# Patient Record
Sex: FEMALE | Race: WHITE
[De-identification: names, ages, dates, MRNs, and addresses within clinical notes are randomized per-mention and may not be internally consistent; named-entity substitution may affect disease eponyms.]

---

## 2019-11-02 ENCOUNTER — HOSPITAL ENCOUNTER (EMERGENCY)
Dept: HOSPITAL 41 - JD.ED | Age: 24
Discharge: HOME | End: 2019-11-02
Payer: COMMERCIAL

## 2019-11-02 DIAGNOSIS — Z91.040: ICD-10-CM

## 2019-11-02 DIAGNOSIS — M53.3: ICD-10-CM
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
## 2019-11-02 DIAGNOSIS — Z88.0: ICD-10-CM

## 2019-11-02 DIAGNOSIS — S06.0X0A: Primary | ICD-10-CM
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
## 2019-11-02 NOTE — EDM.PDOC
ED HPI GENERAL MEDICAL PROBLEM





- General


Chief Complaint: Head Injury


Stated Complaint: BUCKED OFF HORSE HEAD INJURY


Time Seen by Provider: 11/02/19 14:29


Source of Information: Reports: Patient, Family


History Limitations: Reports: No Limitations





- History of Present Illness


INITIAL COMMENTS - FREE TEXT/NARRATIVE: 





The patient presents after being bucked off of her horse with a headache, neck 

pain and tailbone pain.  She was getting off her horse and he bucked.  She 

landed on her buttocks and then head.  She has a headache but no numbness or 

weakness.  She has some neck pain.  She has pain to her coccyx.  She hurt it 

back in April.  She had no LOC.  She walked in to the ER.


Onset: Sudden


Duration: Minutes:


Location: Reports: Head, Neck, Other (Coccyx)


Quality: Reports: Sharp


Severity: Moderate


Improves with: Reports: None


Worsens with: Reports: None


Associated Symptoms: Reports: Headaches.  Denies: Chest Pain, Fever/Chills, 

Nausea/Vomiting, Shortness of Breath


  ** Head


Pain Score (Numeric/FACES): 6





- Related Data


 Allergies











Allergy/AdvReac Type Severity Reaction Status Date / Time


 


latex Allergy  Rash Verified 11/02/19 14:15


 


Penicillins Allergy  Rash Verified 11/02/19 14:15











Home Meds: 


 Home Meds





Ethinyl Estradiol/Drospirenone [Edwige 3 mg-0.02 mg Tablet] 1 each PO DAILY 11/02 /19 [History]











Past Medical History


Cardiovascular History: Reports: None


Respiratory History: Reports: None


Gastrointestinal History: Reports: None


Genitourinary History: Reports: None


OB/GYN History: Reports: None


Neurological History: Reports: None


Psychiatric History: Reports: None


Endocrine/Metabolic History: Reports: None


Hematologic History: Reports: None


Immunologic History: Reports: None


Oncologic (Cancer) History: Reports: None


Dermatologic History: Reports: None





- Infectious Disease History


Infectious Disease History: Reports: None





- Past Surgical History


Head Surgeries/Procedures: Reports: None


HEENT Surgical History: Reports: Oral Surgery


Other Musculoskeletal Surgeries/Procedures:: Left hand surgery.





Social & Family History





- Tobacco Use


Smoking Status *Q: Never Smoker





- Caffeine Use


Caffeine Use: Reports: Coffee





- Recreational Drug Use


Recreational Drug Use: No





ED ROS GENERAL





- Review of Systems


Review Of Systems: See Below


Constitutional: Reports: No Symptoms


HEENT: Reports: No Symptoms


Respiratory: Reports: No Symptoms


Cardiovascular: Reports: No Symptoms


Endocrine: Reports: No Symptoms


GI/Abdominal: Reports: No Symptoms


: Reports: No Symptoms


Musculoskeletal: Reports: Neck Pain, Other (Coccyx pain)





ED EXAM, HEAD INJURY





- Physical Exam


Exam: See Below


Exam Limited By: No Limitations


General Appearance: Alert, No Apparent Distress


Head: Atraumatic, Normocephalic


Ears: Normal External Exam


Nose: Normal Inspection


Neck: Tenderness (Mild midline tenderness)


Respiratory: No Respiratory Distress, Lungs Clear, Normal Breath Sounds


Cardiovascular: Regular Rate, Rhythm, No Edema, No Murmur


GI/Abdominal Exam: Soft, Non-Tender, No Organomegaly, No Mass


Back Exam: Normal Inspection


Extremities: Normal Inspection





Course





- Vital Signs


Last Recorded V/S: 





 Last Vital Signs











Temp  98.5 F   11/02/19 14:12


 


Pulse  90   11/02/19 14:12


 


Resp  16   11/02/19 14:12


 


BP  117/72   11/02/19 14:12


 


Pulse Ox  100   11/02/19 14:12














- Re-Assessments/Exams


Free Text/Narrative Re-Assessment/Exam: 





11/02/19 15:52


I ordered a CT of her head and cervical spine and they looked good.  I will 

discharge her home.





Departure





- Departure


Time of Disposition: 15:55


Disposition: Home, Self-Care 01


Condition: Good


Clinical Impression: 


 Concussion injury of brain, Animal-rider injured by fall from or being thrown 

from horse in noncollision accident, initial encounter, Coccyx pain








- Discharge Information


*PRESCRIPTION DRUG MONITORING PROGRAM REVIEWED*: No


*COPY OF PRESCRIPTION DRUG MONITORING REPORT IN PATIENT JEREMY: No


Referrals: 


Laury Callaway MD [Primary Care Provider] - 1 Week


Additional Instructions: 


Try to take it easy the next couple of days.  Take tylenol or motrin for any 

pain.  Please return if you are worse.

## 2019-11-02 NOTE — CT
Head CT

 

Technique: Multiple axial sections through the brain were obtained.  

Intravenous contrast was not utilized.

 

Comparison: Priors head CT study of 11/07/11.

 

Findings: Ventricles along with basal cisterns and sulci over 

convexities appear within normal limits for the patient's age.  No 

abnormal parenchymal densities are seen.  No evidence of intracranial 

hemorrhage or mass effect.  Bone window settings were reviewed which 

shows no acute calvarial abnormality.  Visualized mastoid sinuses and 

paranasal sinuses show nothing acute.

 

Impression:

1.  Nothing acute is seen on noncontrast head CT exam.

 

Diagnostic code #1

## 2019-11-02 NOTE — CT
CT cervical spine

 

Technique: Multiple axial sections were obtained from above C1 

inferiorly to the bottom of T2.  Reconstructed sagittal and coronal 

images were reviewed.

 

Findings: Slight ligamentum nuchal calcification is seen.  Vertebral 

body heights and disc spaces are maintained.  No fracture is 

appreciated within the cervical spine.  No abnormal subluxation is 

identified.  No bony central or bony neural foraminal stenosis is 

seen.

 

Impression:

1.  Incidental ligamentum nuchal calcification.

2.  Nothing acute is appreciated on CT study of the cervical spine.

 

Diagnostic code #2

## 2021-10-23 ENCOUNTER — HOSPITAL ENCOUNTER (EMERGENCY)
Dept: HOSPITAL 41 - JD.ED | Age: 26
Discharge: HOME | End: 2021-10-23
Payer: COMMERCIAL

## 2021-10-23 DIAGNOSIS — S30.1XXA: Primary | ICD-10-CM

## 2021-10-23 DIAGNOSIS — Z88.0: ICD-10-CM

## 2021-10-23 DIAGNOSIS — Z91.040: ICD-10-CM
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
## 2021-10-23 PROCEDURE — 74177 CT ABD & PELVIS W/CONTRAST: CPT

## 2021-10-23 PROCEDURE — 36415 COLL VENOUS BLD VENIPUNCTURE: CPT

## 2021-10-23 PROCEDURE — 96375 TX/PRO/DX INJ NEW DRUG ADDON: CPT

## 2021-10-23 PROCEDURE — 96374 THER/PROPH/DIAG INJ IV PUSH: CPT

## 2021-10-23 PROCEDURE — 80053 COMPREHEN METABOLIC PANEL: CPT

## 2021-10-23 PROCEDURE — 81001 URINALYSIS AUTO W/SCOPE: CPT

## 2021-10-23 PROCEDURE — 85025 COMPLETE CBC W/AUTO DIFF WBC: CPT

## 2021-10-23 PROCEDURE — 82150 ASSAY OF AMYLASE: CPT

## 2021-10-23 PROCEDURE — 99284 EMERGENCY DEPT VISIT MOD MDM: CPT

## 2021-10-23 NOTE — EDM.PDOC
ED HPI GENERAL MEDICAL PROBLEM





- General


Chief Complaint: Trauma


Stated Complaint: BUCKED OFF HORSE THIS AM


Time Seen by Provider: 10/23/21 09:08


Source of Information: Reports: Patient, Family


History Limitations: Reports: No Limitations





- History of Present Illness


INITIAL COMMENTS - FREE TEXT/NARRATIVE: 


26-year-old female who is in good health presents to the ED after being bucked 

off a horse this morning at approximately 0745 hrs.  She believes the saddle 

horn hit her in the right lower quadrant of the abdomen just above the inguinal 

ligament making it very difficult to walk or flex her hip on the right side.  

She denies hitting her head or hurting her neck.  No back pain no rib pain she 

believes she landed a bit on her left shoulder but has no significant discomfort

in the shoulder at this time.  Pain is primarily right lower quadrant of the 

abdomen.  She rates it as 8 out of 10.  Denies possibility of pregnancy.  Has 

not voided since time of injury.  She is on oral contraceptive birth control.  

She is allergic to latex and penicillin.





Onset: Today, Sudden


Onset Date: 10/23/21


Onset Time: 07:45


Duration: Minutes:, Getting Worse


Location: Reports: Abdomen (Right lower quadrant of the abdomen just above the 

inguinal ligament)


Quality: Reports: Ache


Severity: Moderate


Improves with: Reports: Rest


Worsens with: Reports: Movement (Any attempt to flex or fully weight-bear on the

right leg causes significant pain in the right lower quadrant of the abdomen)


Context: Reports: Trauma (Bucked off a horse this morning.).  Denies: Activity, 

Exercise, Lifting, Sick Contact


Associated Symptoms: Denies: Confusion, Chest Pain, Cough, cough w sputum, 

Diaphoresis, Fever/Chills, Headaches, Loss of Appetite, Malaise, 

Nausea/Vomiting, Rash, Seizure, Shortness of Breath, Syncope, Weakness


Treatments PTA: Reports: Other (see below) (None.)


  ** Right Pelvic


Pain Score (Numeric/FACES): 9





- Related Data


                                    Allergies











Allergy/AdvReac Type Severity Reaction Status Date / Time


 


latex Allergy  Rash Verified 10/23/21 09:01


 


Penicillins Allergy  Rash Verified 10/23/21 09:01











Home Meds: 


                                    Home Meds





Ethinyl Estradiol/Drospirenone [Edwige 3 mg-0.02 mg Tablet] 1 each PO DAILY 11/ 02/19 [History]


oxyCODONE HCl/Acetaminophen [Percocet 5-325 mg Tablet] 1 each PO Q4H PRN #15 

tablet 10/23/21 [Rx]











Past Medical History


Cardiovascular History: Reports: None


Respiratory History: Reports: None


Gastrointestinal History: Reports: None


Genitourinary History: Reports: None


OB/GYN History: Reports: None


Neurological History: Reports: None


Psychiatric History: Reports: None


Endocrine/Metabolic History: Reports: None


Hematologic History: Reports: None


Immunologic History: Reports: None


Oncologic (Cancer) History: Reports: None


Dermatologic History: Reports: None





- Infectious Disease History


Infectious Disease History: Reports: None





- Past Surgical History


Head Surgeries/Procedures: Reports: None


HEENT Surgical History: Reports: Oral Surgery


Other Musculoskeletal Surgeries/Procedures:: Left hand surgery.





Social & Family History





- Caffeine Use


Caffeine Use: Reports: Coffee





- Living Situation & Occupation


Living situation: Reports: Single


Occupation: Employed





Review of Systems





- Review of Systems


Review Of Systems: See Below


Constitutional: Reports: No Symptoms


Eyes: Reports: No Symptoms


Ears: Reports: No Symptoms


Nose: Reports: No Symptoms


Mouth/Throat: Reports: No Symptoms


Respiratory: Reports: No Symptoms


Cardiovascular: Reports: No Symptoms


GI/Abdominal: Reports: No Symptoms


Genitourinary: Reports: No Symptoms


Musculoskeletal: Reports: No Symptoms


Skin: Reports: No Symptoms


Neurological: Reports: No Symptoms


Psychiatric: Reports: No Symptoms





ED EXAM, GENERAL





- Physical Exam


Exam: See Below


Exam Limited By: No Limitations


General Appearance: Alert, WD/WN, Mild Distress, Other (Temperature is 36.2 

degrees heart rate 54 and sinus respiratory is 18 with O2 sats 100% room air.  

BP is 103/61)


Throat/Mouth: Normal Inspection, Normal Lips, Normal Oropharynx, Other


Head: Atraumatic (No injury to the tongue or dentition), Normocephalic


Neck: Normal Inspection, Supple, Non-Tender, Full Range of Motion.  No: Carotid 

Bruit, Lymphadenopathy (L), Lymphadenopathy (R)


Respiratory/Chest: No Respiratory Distress, Lungs Clear, Normal Breath Sounds, 

No Accessory Muscle Use, Other


Cardiovascular: Normal Peripheral Pulses, Regular Rate, Rhythm, No Edema, No 

Gallop, No Murmur (No pain on firm compression of chest wall and sternum), No 

Rub


Peripheral Pulses: 3+: Carotid (L), Carotid (R), Posterior Tibial (L), Posterior

 Tibial (R), Dorsalis Pedis (L), Dorsalis Pedis (R)


GI/Abdominal: Normal Bowel Sounds, Soft, No Organomegaly, No Distention, 

Guarding, Tender (Tenderness right lower quadrant just above the inguinal 

ligament and above the pubic symphysis and slightly lateral to this area.  There

 is mild erythema in this area.  There is guarding in this area)


Back Exam: Normal Inspection, Full Range of Motion.  No: CVA Tenderness (L), CVA

 Tenderness (R)


Extremities: Normal Inspection, Non-Tender, No Pedal Edema, Other (Full range of

 motion of both upper extremities no pain over the clavicles or 

acromioclavicular joints.  She has pain with any attempt to fully trying to 

fully flex or stand with full weight on her right lower extremity in her lower 

abdomen and pelvis area.)


Neurological: Alert, Oriented, CN II-XII Intact, Normal Cognition


Psychiatric: Normal Affect, Anxious


Skin Exam: Warm, Dry, Intact, Normal Color, No Rash





Course





- Vital Signs


Last Recorded V/S: 


                                Last Vital Signs











Temp  36.2 C   10/23/21 08:58


 


Pulse  60   10/23/21 11:14


 


Resp  16   10/23/21 11:14


 


BP  101/60   10/23/21 11:14


 


Pulse Ox  100   10/23/21 11:14














- Orders/Labs/Meds


Orders: 


                               Active Orders 24 hr











 Category Date Time Status


 


 Dextrose 5%-0.9% NaCl [Dextrose 5%-Normal Saline] 1,000 Med  10/23/21 09:15 

Active





 ml   





 IV ASDIRECTED   








                                Medication Orders





Dextrose/Sodium Chloride (Dextrose 5%-Normal Saline)  1,000 mls @ 150 mls/hr IV 

ASDIRECTED ALAINA


   Last Admin: 10/23/21 09:23  Dose: 150 mls/hr


   Documented by: PRISCILLA








Labs: 


                                Laboratory Tests











  10/23/21 10/23/21 10/23/21 Range/Units





  09:45 09:45 10:28 


 


WBC  9.42    (3.98-10.04)  K/mm3


 


RBC  4.20    (3.98-5.22)  M/mm3


 


Hgb  13.0    (11.2-15.7)  gm/dl


 


Hct  39.3    (34.1-44.9)  %


 


MCV  93.6    (79.4-94.8)  fl


 


MCH  31.0    (25.6-32.2)  pg


 


MCHC  33.1    (32.2-35.5)  g/dl


 


RDW Std Deviation  41.3    (36.4-46.3)  fL


 


Plt Count  241    (182-369)  K/mm3


 


MPV  10.4    (9.4-12.3)  fl


 


Neut % (Auto)  75.5 H    (34.0-71.1)  %


 


Lymph % (Auto)  16.3 L    (19.3-51.7)  %


 


Mono % (Auto)  6.6    (4.7-12.5)  %


 


Eos % (Auto)  1.1    (0.7-5.8)  


 


Baso % (Auto)  0.4    (0.1-1.2)  %


 


Neut # (Auto)  7.11 H    (1.56-6.13)  K/mm3


 


Lymph # (Auto)  1.54    (1.18-3.74)  K/mm3


 


Mono # (Auto)  0.62 H    (0.24-0.36)  K/mm3


 


Eos # (Auto)  0.10    (0.04-0.36)  K/mm3


 


Baso # (Auto)  0.04    (0.01-0.08)  K/mm3


 


Sodium   139   (136-145)  mEq/L


 


Potassium   4.3   (3.5-5.1)  mEq/L


 


Chloride   105   ()  mEq/L


 


Carbon Dioxide   28   (21-32)  mEq/L


 


Anion Gap   10.3   (5-15)  


 


BUN   18   (7-18)  mg/dL


 


Creatinine   1.1 H   (0.55-1.02)  mg/dL


 


Est Cr Clr Drug Dosing   TNP   


 


Estimated GFR (MDRD)   59   (>60)  mL/min


 


BUN/Creatinine Ratio   16.4   (14-18)  


 


Glucose   78   (70-99)  mg/dL


 


Calcium   8.3 L   (8.5-10.1)  mg/dL


 


Total Bilirubin   0.4   (0.2-1.0)  mg/dL


 


AST   23   (15-37)  U/L


 


ALT   33   (14-59)  U/L


 


Alkaline Phosphatase   64   ()  U/L


 


Total Protein   6.7   (6.4-8.2)  g/dl


 


Albumin   3.1 L   (3.4-5.0)  g/dl


 


Globulin   3.6   gm/dL


 


Albumin/Globulin Ratio   0.9 L   (1-2)  


 


Amylase   69   ()  U/L


 


Urine Color    Yellow  (Yellow)  


 


Urine Appearance    Slt cloudy H  (Clear)  


 


Urine pH    7.0  (5.0-8.0)  


 


Ur Specific Gravity    1.015  (1.005-1.030)  


 


Urine Protein    Negative  (Negative)  


 


Urine Glucose (UA)    Negative  (Negative)  


 


Urine Ketones    Negative  (Negative)  


 


Urine Occult Blood    Negative  (Negative)  


 


Urine Nitrite    Negative  (Negative)  


 


Urine Bilirubin    Negative  (Negative)  


 


Urine Urobilinogen    0.2  (0.2-1.0)  


 


Ur Leukocyte Esterase    Negative  (Negative)  


 


Urine RBC    0-5  (0-5)  /hpf


 


Urine WBC    5-10 H  (0-5)  /hpf


 


Ur Epithelial Cells    5-10 H  (0-5)  /hpf


 


Urine Bacteria    Few  (FEW)  /hpf


 


Urine Mucus    Not seen  (FEW)  /hpf











Meds: 


Medications











Generic Name Dose Route Start Last Admin





  Trade Name Freq  PRN Reason Stop Dose Admin


 


Dextrose/Sodium Chloride  1,000 mls @ 150 mls/hr  10/23/21 09:15  10/23/21 09:23





  Dextrose 5%-Normal Saline  IV   150 mls/hr





  ASDIRECTED ALAINA   Administration














Discontinued Medications














Generic Name Dose Route Start Last Admin





  Trade Name Freq  PRN Reason Stop Dose Admin


 


Hydromorphone HCl  0.5 mg  10/23/21 09:05  10/23/21 09:25





  Hydromorphone 0.5 Mg/0.5 Ml Syringe  IVPUSH  10/23/21 09:06  0.5 mg





  ONETIME ONE   Administration


 


Iopamidol  100 ml  10/23/21 09:14  10/23/21 09:38





  Iopamidol 612 Mg/Ml 100 Ml Bottle  IVPUSH  10/23/21 09:15  100 ml





  ONETIME ONE   Administration


 


Metoclopramide HCl  7.5 mg  10/23/21 09:05  10/23/21 09:24





  Metoclopramide 10 Mg/2 Ml Sdv  IVPUSH  10/23/21 09:06  7.5 mg





  ONETIME ONE   Administration


 


Sodium Chloride  10 ml  10/23/21 09:14  10/23/21 09:38





  Sodium Chloride 0.9% 10 Ml Syringe  FLUSH  10/23/21 09:15  10 ml





  ONETIME ONE   Administration














- Radiology Interpretation


Free Text/Narrative:: 


26-year-old female presents to the ED after being bucked off a horse this 

morning.  Injury occurred about an hour and a quarter prior to arrival in the 

ED.  She believes that the saddle horn struck her in the right lower quadrant of

her abdomen just above the inguinal ligament making it extremely difficult to 

put any weight on her right lower extremity or flex her hip indicating injury to

the iliopsoas musculature.  She has not voided since time of injury.  She denies

possibility of pregnancy and is on oral birth control.  No other injuries 

identified on examination.  Patient will be given IV fluids D5 normal saline 150

mils per hour.  Routine labs ordered including a serum amylase and CMP.  She 

will have CT of the abdomen and pelvis with IV contrast.  Given Dilaudid 0.5 mg 

IV and Reglan 7.5 mg IV for pain and nausea relief.








- Re-Assessments/Exams


Free Text/Narrative Re-Assessment/Exam: 





10/23/21 10:17 White count is normal at 9.42.  Auto differential shows 75.5% 

neutrophils.  Hemoglobin is 13.0 with hematocrit of 39.3 and platelet count of 

241,000.  Sodium 139 with a potassium of 4.3 chloride 105 with a bicarb of 28.  

Anion gap is 10.3 BUN is 18 with a creatinine of 1.1 estimated GFR is 59.  

Glucose is 78 with a calcium of 8.3 slightly low.  Liver function is normal.  

Total protein is 6.7 with albumin fraction of 3.1.  Serum amylase is 69.





10/23/21 10:18 CT of the abdomen and pelvis has been performed with IV contrast 

only.  Liver shows no focal abnormality.  Spleen size is normal.  Pancreas 

appears to be within normal limits.  Adrenal glands appear normal.  Kidneys show

symmetric contrast enhancement.  No hydronephrosis or mass is seen.  Gallbladder

contains no calcified gallstones.  Abdominal aorta shows no aneurysm.  No 

retroperitoneal adenopathy or mesenteric abnormalities are seen.  Appendix is 

seen which is normal.  No pelvic mass or adenopathy is seen.  Delayed images 

show contrast within the distal ureters and within the bladder.  No contrast 

extravasation is seen.  Increase stool is appreciated throughout the colon.  

Very minimal inflammatory changes seen within the right inguinal region com

patible with minimal change from trauma.  There is no focal fluid collections 

being seen.  No abdominal musculature abnormalities were seen.  Bone window 

settings were obtained no acute osseous abnormalities are identified.  At this 

time she believes she could void and we will obtain a urinalysis to make sure 

there is been no contusion to the urinary bladder





10/23/21 11:12 Urinalysis reveals 5-10 white blood cells per high-power field 

with some epithelial cells suggesting contamination.  There is no blood in the 

urine.  Patient and her mother were advised.  She will be discharged home to 

pursue activity as tolerated.  Ice pack to the right lower quadrant for 1/2-hour

out of every 4 hours today and tomorrow to reduce swelling.  Motrin 600 mg every

6 hours to relieve pain and inflammation.  Percocet tabs 5 325 mg strength 1 or 

2 every 4-6 hours if necessary for pain relief for the next 3 to 4 days.








Departure





- Departure


Time of Disposition: 11:13


Disposition: Home, Self-Care 01


Condition: Fair


Clinical Impression: 


Abdominal wall contusion


Qualifiers:


 Encounter type: initial encounter Qualified Code(s): S30.1XXA - Contusion of 

abdominal wall, initial encounter








- Discharge Information


*PRESCRIPTION DRUG MONITORING PROGRAM REVIEWED*: Not Applicable


*COPY OF PRESCRIPTION DRUG MONITORING REPORT IN PATIENT JEREMY: Not Applicable


Prescriptions: 


oxyCODONE HCl/Acetaminophen [Percocet 5-325 mg Tablet] 1 each PO Q4H PRN #15 

tablet


 PRN Reason: Abdominal wall contusion


Referrals: 


Laury Callaway MD [Primary Care Provider] - 


Forms:  ED Department Discharge


Additional Instructions: 


Evaluation in the emergency room today in regards to injuries sustained from 

being bucked off a horse this morning.  Injuries primarily to the right lower 

quadrant of the abdomen just above the inguinal ligament with contusion to the 

abdominal wall evident on examination.  CT scan of the abdomen and pelvis was 

performed and reveals no internal organ damage or bony injury to the pelvis.  

Urinalysis proved to be negative for any blood in the urine to suggest bladder 

contusion or injury.  Expect the pain in the right lower quadrant to get worse 

over the next 24 to 48 hours.  Suggest very low level activity for the next 2 

days.  Ice pack to the area 1/2 hours of every 4 hours today and tomorrow.  

After this may apply heat to the area.  Suggest Motrin 600 mg every 6 hours as 

needed for relief of pain and inflammation.  If pain is not controlled by Motrin

totally may use Percocet tab 5/325 mg strength 1 tablet every 4-6 hours as 

necessary for pain relief in combination with the Motrin.  This should be taken 

with a little bit of food in your stomach as it can cause nausea on empty 

stomach.  The CT does reveal that you do have significant amount of stool 

throughout the colon compatible with constipation.  The pain medication will 

cause constipation as a side effect.  May want to pursue MiraLAX powder 17 g 

once daily to prevent constipation from occurring until feeling better.  I 

suspect the abdominal wall contusion will be the better part of a week to 10 

days to be completely back to normal.  Return to primary care physician or 

provider if any other problems occur.





Sepsis Event Note (ED)





- Evaluation


Sepsis Screening Result: No Definite Risk





- Focused Exam


Vital Signs: 


                                   Vital Signs











  Temp Pulse Resp BP Pulse Ox


 


 10/23/21 11:14   60  16  101/60  100


 


 10/23/21 08:58  36.2 C  54 L  18  103/61  100














- My Orders


Last 24 Hours: 


My Active Orders





10/23/21 09:15


Dextrose 5%-0.9% NaCl [Dextrose 5%-Normal Saline] 1,000 ml IV ASDIRECTED 














- Assessment/Plan


Last 24 Hours: 


My Active Orders





10/23/21 09:15


Dextrose 5%-0.9% NaCl [Dextrose 5%-Normal Saline] 1,000 ml IV ASDIRECTED

## 2021-10-23 NOTE — CT
CT abdomen and pelvis

 

Technique: Multiple axial sections were obtained from above the dome 

of the diaphragm inferiorly through the pubic symphysis.  Intravenous 

contrast was utilized.  No oral contrast has been given.  Delayed 

images were obtained through the bladder.  Reconstructed coronal and 

sagittal images were obtained.

 

Comparison: Prior right upper quadrant abdominal ultrasound of 

12/23/13.

 

Findings: Visualized lung bases show minimal atelectasis or scarring 

within the left base.

 

Liver shows no focal abnormality.  Spleen size is normal.  Pancreas 

appears within normal limits.  Adrenal glands appear within normal 

limits.  Kidneys show symmetric contrast enhancement.  No 

hydronephrosis or mass is seen.  Gallbladder contains no calcified 

gallstones.  Abdominal aorta shows no aneurysm.  No retroperitoneal 

adenopathy or mesenteric abnormalities are seen.  Appendix is seen 

which is normal.  No pelvic mass or adenopathy is seen.

 

Delayed images show contrast within the distal ureters and within the 

bladder.  No contrast extravasation is seen.

 

Increased stool is seen throughout the colon.

 

Very minimal inflammatory change is seen within the upper right 

inguinal region compatible with minimal change from trauma.  There is 

no focal fluid collections being seen.  No abdominal wall musculature 

abnormalities are seen.

 

Bone window settings were obtained.  No acute osseous abnormality is 

appreciated.

 

Impression:

1.  Mild increased stool throughout the colon.

2.  Minimal inflammatory change is seen within the right upper 

inguinal region.  This is compatible with minimal change from previous

 trauma.

3.  Other normal findings as described above.

 

Diagnostic code #2